# Patient Record
Sex: FEMALE | Race: WHITE | NOT HISPANIC OR LATINO | URBAN - METROPOLITAN AREA
[De-identification: names, ages, dates, MRNs, and addresses within clinical notes are randomized per-mention and may not be internally consistent; named-entity substitution may affect disease eponyms.]

---

## 2017-03-20 ENCOUNTER — INPATIENT (INPATIENT)
Facility: HOSPITAL | Age: 77
LOS: 1 days | Discharge: HOME | DRG: 311 | End: 2017-03-22
Attending: HOSPITALIST | Admitting: HOSPITALIST
Payer: MEDICARE

## 2017-03-20 VITALS
HEART RATE: 99 BPM | OXYGEN SATURATION: 98 % | HEIGHT: 60 IN | DIASTOLIC BLOOD PRESSURE: 91 MMHG | WEIGHT: 171.96 LBS | TEMPERATURE: 97 F | RESPIRATION RATE: 18 BRPM | SYSTOLIC BLOOD PRESSURE: 169 MMHG

## 2017-03-20 DIAGNOSIS — K21.9 GASTRO-ESOPHAGEAL REFLUX DISEASE WITHOUT ESOPHAGITIS: ICD-10-CM

## 2017-03-20 DIAGNOSIS — N39.3 STRESS INCONTINENCE (FEMALE) (MALE): ICD-10-CM

## 2017-03-20 DIAGNOSIS — E87.1 HYPO-OSMOLALITY AND HYPONATREMIA: ICD-10-CM

## 2017-03-20 DIAGNOSIS — Z90.710 ACQUIRED ABSENCE OF BOTH CERVIX AND UTERUS: Chronic | ICD-10-CM

## 2017-03-20 DIAGNOSIS — I24.9 ACUTE ISCHEMIC HEART DISEASE, UNSPECIFIED: ICD-10-CM

## 2017-03-20 LAB
ALBUMIN SERPL ELPH-MCNC: 4.4 G/DL — SIGNIFICANT CHANGE UP (ref 3.3–5.2)
ALP SERPL-CCNC: 94 U/L — SIGNIFICANT CHANGE UP (ref 40–120)
ALT FLD-CCNC: 23 U/L — SIGNIFICANT CHANGE UP
ANION GAP SERPL CALC-SCNC: 14 MMOL/L — SIGNIFICANT CHANGE UP (ref 5–17)
AST SERPL-CCNC: 34 U/L — HIGH
BASOPHILS # BLD AUTO: 0 K/UL — SIGNIFICANT CHANGE UP (ref 0–0.2)
BASOPHILS NFR BLD AUTO: 0.2 % — SIGNIFICANT CHANGE UP (ref 0–2)
BILIRUB SERPL-MCNC: 0.5 MG/DL — SIGNIFICANT CHANGE UP (ref 0.4–2)
BUN SERPL-MCNC: 23 MG/DL — HIGH (ref 8–20)
CALCIUM SERPL-MCNC: 9.1 MG/DL — SIGNIFICANT CHANGE UP (ref 8.6–10.2)
CHLORIDE SERPL-SCNC: 83 MMOL/L — LOW (ref 98–107)
CK MB CFR SERPL CALC: 5.4 NG/ML — SIGNIFICANT CHANGE UP (ref 0–6.7)
CK SERPL-CCNC: 238 U/L — HIGH (ref 25–170)
CO2 SERPL-SCNC: 25 MMOL/L — SIGNIFICANT CHANGE UP (ref 22–29)
CREAT SERPL-MCNC: 0.77 MG/DL — SIGNIFICANT CHANGE UP (ref 0.5–1.3)
EOSINOPHIL # BLD AUTO: 0.1 K/UL — SIGNIFICANT CHANGE UP (ref 0–0.5)
EOSINOPHIL NFR BLD AUTO: 0.8 % — SIGNIFICANT CHANGE UP (ref 0–6)
GLUCOSE SERPL-MCNC: 111 MG/DL — SIGNIFICANT CHANGE UP (ref 70–115)
HCT VFR BLD CALC: 40.1 % — SIGNIFICANT CHANGE UP (ref 37–47)
HGB BLD-MCNC: 14.4 G/DL — SIGNIFICANT CHANGE UP (ref 12–16)
LYMPHOCYTES # BLD AUTO: 2.9 K/UL — SIGNIFICANT CHANGE UP (ref 1–4.8)
LYMPHOCYTES # BLD AUTO: 24 % — SIGNIFICANT CHANGE UP (ref 20–55)
MCHC RBC-ENTMCNC: 29.9 PG — SIGNIFICANT CHANGE UP (ref 27–31)
MCHC RBC-ENTMCNC: 35.9 G/DL — SIGNIFICANT CHANGE UP (ref 32–36)
MCV RBC AUTO: 83.4 FL — SIGNIFICANT CHANGE UP (ref 81–99)
MONOCYTES # BLD AUTO: 1.4 K/UL — HIGH (ref 0–0.8)
MONOCYTES NFR BLD AUTO: 11.2 % — HIGH (ref 3–10)
NEUTROPHILS # BLD AUTO: 7.8 K/UL — SIGNIFICANT CHANGE UP (ref 1.8–8)
NEUTROPHILS NFR BLD AUTO: 63.6 % — SIGNIFICANT CHANGE UP (ref 37–73)
PLATELET # BLD AUTO: 276 K/UL — SIGNIFICANT CHANGE UP (ref 150–400)
POTASSIUM SERPL-MCNC: 3.6 MMOL/L — SIGNIFICANT CHANGE UP (ref 3.5–5.3)
POTASSIUM SERPL-SCNC: 3.6 MMOL/L — SIGNIFICANT CHANGE UP (ref 3.5–5.3)
PROT SERPL-MCNC: 7.9 G/DL — SIGNIFICANT CHANGE UP (ref 6.6–8.7)
RBC # BLD: 4.81 M/UL — SIGNIFICANT CHANGE UP (ref 4.4–5.2)
RBC # FLD: 12.2 % — SIGNIFICANT CHANGE UP (ref 11–15.6)
SODIUM SERPL-SCNC: 122 MMOL/L — LOW (ref 135–145)
TROPONIN T SERPL-MCNC: <0.01 NG/ML — SIGNIFICANT CHANGE UP (ref 0–0.06)
WBC # BLD: 12.3 K/UL — HIGH (ref 4.8–10.8)
WBC # FLD AUTO: 12.3 K/UL — HIGH (ref 4.8–10.8)

## 2017-03-20 PROCEDURE — 99285 EMERGENCY DEPT VISIT HI MDM: CPT | Mod: 25

## 2017-03-20 PROCEDURE — 71010: CPT | Mod: 26

## 2017-03-20 PROCEDURE — 93010 ELECTROCARDIOGRAM REPORT: CPT | Mod: 76

## 2017-03-20 PROCEDURE — 99223 1ST HOSP IP/OBS HIGH 75: CPT | Mod: AI

## 2017-03-20 RX ORDER — SIMVASTATIN 20 MG/1
1 TABLET, FILM COATED ORAL
Qty: 0 | Refills: 0 | COMMUNITY

## 2017-03-20 RX ORDER — SODIUM CHLORIDE 9 MG/ML
3 INJECTION INTRAMUSCULAR; INTRAVENOUS; SUBCUTANEOUS ONCE
Qty: 0 | Refills: 0 | Status: COMPLETED | OUTPATIENT
Start: 2017-03-20 | End: 2017-03-20

## 2017-03-20 RX ORDER — METOPROLOL TARTRATE 50 MG
25 TABLET ORAL
Qty: 0 | Refills: 0 | Status: DISCONTINUED | OUTPATIENT
Start: 2017-03-20 | End: 2017-03-22

## 2017-03-20 RX ORDER — PANTOPRAZOLE SODIUM 20 MG/1
40 TABLET, DELAYED RELEASE ORAL
Qty: 0 | Refills: 0 | Status: DISCONTINUED | OUTPATIENT
Start: 2017-03-20 | End: 2017-03-22

## 2017-03-20 RX ORDER — ACETAMINOPHEN 500 MG
650 TABLET ORAL EVERY 6 HOURS
Qty: 0 | Refills: 0 | Status: DISCONTINUED | OUTPATIENT
Start: 2017-03-20 | End: 2017-03-22

## 2017-03-20 RX ORDER — SODIUM CHLORIDE 9 MG/ML
500 INJECTION INTRAMUSCULAR; INTRAVENOUS; SUBCUTANEOUS ONCE
Qty: 0 | Refills: 0 | Status: COMPLETED | OUTPATIENT
Start: 2017-03-20 | End: 2017-03-20

## 2017-03-20 RX ORDER — SIMVASTATIN 20 MG/1
20 TABLET, FILM COATED ORAL AT BEDTIME
Qty: 0 | Refills: 0 | Status: DISCONTINUED | OUTPATIENT
Start: 2017-03-20 | End: 2017-03-22

## 2017-03-20 RX ORDER — OXYBUTYNIN CHLORIDE 5 MG
1 TABLET ORAL
Qty: 0 | Refills: 0 | COMMUNITY

## 2017-03-20 RX ORDER — ASPIRIN/CALCIUM CARB/MAGNESIUM 324 MG
81 TABLET ORAL DAILY
Qty: 0 | Refills: 0 | Status: DISCONTINUED | OUTPATIENT
Start: 2017-03-20 | End: 2017-03-22

## 2017-03-20 RX ORDER — OXYBUTYNIN CHLORIDE 5 MG
5 TABLET ORAL
Qty: 0 | Refills: 0 | Status: DISCONTINUED | OUTPATIENT
Start: 2017-03-20 | End: 2017-03-22

## 2017-03-20 RX ORDER — ONDANSETRON 8 MG/1
4 TABLET, FILM COATED ORAL EVERY 6 HOURS
Qty: 0 | Refills: 0 | Status: DISCONTINUED | OUTPATIENT
Start: 2017-03-20 | End: 2017-03-22

## 2017-03-20 RX ORDER — ENOXAPARIN SODIUM 100 MG/ML
40 INJECTION SUBCUTANEOUS DAILY
Qty: 0 | Refills: 0 | Status: DISCONTINUED | OUTPATIENT
Start: 2017-03-20 | End: 2017-03-22

## 2017-03-20 RX ORDER — PANTOPRAZOLE SODIUM 20 MG/1
1 TABLET, DELAYED RELEASE ORAL
Qty: 0 | Refills: 0 | COMMUNITY

## 2017-03-20 RX ADMIN — SODIUM CHLORIDE 3 MILLILITER(S): 9 INJECTION INTRAMUSCULAR; INTRAVENOUS; SUBCUTANEOUS at 03:45

## 2017-03-20 RX ADMIN — Medication 5 MILLIGRAM(S): at 17:29

## 2017-03-20 RX ADMIN — Medication 1 TABLET(S): at 11:49

## 2017-03-20 RX ADMIN — Medication 650 MILLIGRAM(S): at 17:33

## 2017-03-20 RX ADMIN — ENOXAPARIN SODIUM 40 MILLIGRAM(S): 100 INJECTION SUBCUTANEOUS at 11:49

## 2017-03-20 RX ADMIN — Medication 25 MILLIGRAM(S): at 17:29

## 2017-03-20 RX ADMIN — Medication 650 MILLIGRAM(S): at 08:20

## 2017-03-20 RX ADMIN — SIMVASTATIN 20 MILLIGRAM(S): 20 TABLET, FILM COATED ORAL at 23:00

## 2017-03-20 RX ADMIN — Medication 81 MILLIGRAM(S): at 11:49

## 2017-03-20 RX ADMIN — SODIUM CHLORIDE 500 MILLILITER(S): 9 INJECTION INTRAMUSCULAR; INTRAVENOUS; SUBCUTANEOUS at 06:29

## 2017-03-20 RX ADMIN — Medication 650 MILLIGRAM(S): at 18:45

## 2017-03-20 NOTE — H&P ADULT - PROBLEM SELECTOR PLAN 1
Empiric aspirin therapy initiated. Serial troponin levels requested. Telemetry monitoring. Cardiology consultation requested. Blood pressure elevated on initial presentation and metoprolol also initiated. Empiric aspirin therapy initiated. Serial troponin levels requested. Telemetry monitoring. Cardiology consultation requested (Dr. Perez service contacted at the request of the patient and her ). Blood pressure elevated on initial presentation and metoprolol also initiated.

## 2017-03-20 NOTE — ED PROVIDER NOTE - OBJECTIVE STATEMENT
75 yo F presents to ED via EMS with  c/o sudden onset of sharp, midsternal chest pain which started while lying in bed this AM.  Pt states she fell on the ice 3 days ago and hurt her back and twisted when she fel and has been having some assoc rib pain. Pt denies any assoc SOB, N/V, diaphoresis or syncope.  Pt states pain was initially 9/10.  Pt's  gave her 1 SL NTG which decreased pain to 5/10 and then resolved after being given ASA and oxygen by EMS PTA.  Pt believes she had a negative stress test approx 6 yrs ago

## 2017-03-20 NOTE — ED PROVIDER NOTE - CONSTITUTIONAL, MLM
normal... Well appearing, well nourished elderly F awake, alert, oriented to person, place, time/situation and in no apparent distress.

## 2017-03-20 NOTE — ED ADULT NURSE NOTE - CHIEF COMPLAINT QUOTE
Patient states that she woke up from sleep with midsternal chest pain. As per EMS patient states that she fell a few days ago and was having back pain.  gave patient nitro spray at home with some relief, ems administered 324 asa, at this time patient states that she feels better

## 2017-03-20 NOTE — H&P ADULT - NSHPPHYSICALEXAM_GEN_ALL_CORE
General appearance: NAD, Awake, Alert  HEENT: NCAT, Conjunctiva clear, EOMI, Pupils reactive  Neck: Supple, No JVD, No tenderness  Lungs: Clear to auscultation, Breath sound equal bilaterally, No wheezes, No rales  Cardiovascular: S1S2, Regular rhythm  Abdomen: Soft, Nontender, Nondistended, No guarding/rebound, Positive bowel sounds  Extremities: No clubbing, No cyanosis, No edema, No calf tenderness  Neuro: Strength equal bilaterally, No tremors  Psychiatric: Awake and oriented, No anxiety

## 2017-03-20 NOTE — ED ADULT TRIAGE NOTE - CHIEF COMPLAINT QUOTE
Patient states that she woke up from sleep with midsternal chest pain. As per EMS patient states that she fell a few days ago and was having back pain.  gave patient nitro spray at home with some relief Patient states that she woke up from sleep with midsternal chest pain. As per EMS patient states that she fell a few days ago and was having back pain.  gave patient nitro spray at home with some relief, ems administered 324 asa, at this time patient states that she feels better

## 2017-03-20 NOTE — ED PROVIDER NOTE - PROGRESS NOTE DETAILS
Labs as noted.  Will admit for hyponatremia and ACS.  Case d/w Hospitalist/Dr. Herzog and will admit to Tele

## 2017-03-20 NOTE — ED ADULT NURSE REASSESSMENT NOTE - NS ED NURSE REASSESS COMMENT FT1
Dr Sapp, cardiology at bedside
Pt sitting comfortably on stretcher, denies any pain, POC discussed with pt, awaiting bed assignment, spouse at bedside, pt and spouse verbalize understanding, safety and comfort measures in place.
Assumed pt care at this time. Pt A & Ox3, respirations are even & unlabored. Pt states she has back pain from sitting in the bed and a fall she had on Thursday, Pt states she slipped on ice, denies LOC, hitting head. Pt denies chest pain at this time.  Admitting MD Mchugh at bedside.

## 2017-03-20 NOTE — H&P ADULT - HISTORY OF PRESENT ILLNESS
76F presented with complaints of chest discomfort which woke her from sleep earlier this morning. The pain was located in the center of her chest and described as sharp with radiation to the back. She denied any associated dyspnea or palpitations. The patient also denied any similar symptoms in the past. The chest pain improved with the administration of nitroglycerin. The patient had a second episode of similar symptoms while in the emergency department.  Initial laboratory results noted hyponatremia. The patient reported having episodes of nausea and vomiting at home due to the back pain from a recent fall. The patient had fallen onto her buttocks resulting in lower back pain. She was able to tolerate intake of fluids but had been eating a small amount of food. 76F presented with complaints of chest discomfort which woke her from sleep earlier this morning. The pain was located in the center of her chest and described as sharp with radiation to the back. She denied any associated dyspnea or palpitations. The patient also denied any similar symptoms in the past. The chest pain improved with the administration of nitroglycerin. The patient had a second episode of similar symptoms while in the emergency department. She reports a negative stress test many years ago.  Initial laboratory results noted hyponatremia. The patient reported having episodes of nausea and vomiting at home due to the back pain from a recent fall. The patient had fallen onto her buttocks resulting in lower back pain. She was able to tolerate intake of fluids but had been eating a small amount of food.

## 2017-03-20 NOTE — CONSULT NOTE ADULT - SUBJECTIVE AND OBJECTIVE BOX
[facility], THE HEART CENTER                                   [address] [place] [address]                                                      PHONE: [phone]                                                         FAX: [phone]  -------------------------------------------------------------------------------------------------------------------------------    76y Female with past medical history as under chest discomfort which woke her from sleep earlier this morning. The pain was located in the center of her chest and described as sharp with radiation to the back. She denied any associated dyspnea or palpitations. The patient also denied any similar symptoms in the past. The chest pain improved with the administration of nitroglycerin. The patient had a second episode of similar symptoms while in the emergency department. Pt otherwise with good functional capacity at baseline. Reportedly fell 2 days ago and complains of back pain and has been using Advil with tylenol that she usually does not take. Pt hypertensive in ED but usually does not have elevated BP.    PAST MEDICAL & SURGICAL HISTORY:  GERD (gastroesophageal reflux disease)  Stress incontinence, female  S/P hysterectomy    No Known Allergies      Review of Systems:   Positive for chest pain, back pain  Rest of the systems were reviewed and was negative.     Social History:  No smoking   No alcohol  No other drug use    Vital Signs Last 24 Hrs  T(C): 36.3, Max: 36.3 (03-20 @ 07:20)  T(F): 97.4, Max: 97.4 (03-20 @ 07:20)  HR: 90 (90 - 99)  BP: 162/83 (162/83 - 169/91)  BP(mean): --  RR: 18 (18 - 18)  SpO2: 99% (98% - 99%)    PHYSICAL EXAM:  Constitutional: Oriented to time, place and person. Appears well developed, well nourished; alert and co-operative  HEENT:     Conjunctiva normal, Normal oral mucosa, No JVD	  Cardiovascular: Normal S1 S2, No murmurs  Respiratory: Lungs clear to auscultation; no crepitations, no wheeze  Gastrointestinal:  Soft, Non-tender, + BS	  Extremities: No cyanosis, clubbing or edema  Skin: Warm and dry  Neurologic: Alert oriented to time place and person  Psychiatric: affect was normal        LABS:                        14.4   12.3  )-----------( 276      ( 20 Mar 2017 03:35 )             40.1     20 Mar 2017 03:35    122    |  83     |  23.0   ----------------------------<  111    3.6     |  25.0   |  0.77     Ca    9.1        20 Mar 2017 03:35    TPro  7.9    /  Alb  4.4    /  TBili  0.5    /  DBili  x      /  AST  34     /  ALT  23     /  AlkPhos  94     20 Mar 2017 03:35    CARDIAC MARKERS ( 20 Mar 2017 03:35 )  x     / <0.01 ng/mL / 238 U/L / x     / 5.4 ng/mL          RADIOLOGY & ADDITIONAL STUDIES:    CARDIOLOGY TESTING:     ECG: NSR with inferior q waves     MEDICATIONS:  MEDICATIONS  (STANDING):  pantoprazole    Tablet 40milliGRAM(s) Oral before breakfast  metoprolol 25milliGRAM(s) Oral two times a day  aspirin  chewable 81milliGRAM(s) Oral daily  simvastatin 20milliGRAM(s) Oral at bedtime  oxybutynin 5milliGRAM(s) Oral two times a day  multivitamin 1Tablet(s) Oral daily  enoxaparin Injectable 40milliGRAM(s) SubCutaneous daily    MEDICATIONS  (PRN):  ondansetron Injectable 4milliGRAM(s) IV Push every 6 hours PRN Nausea and/or Vomiting  acetaminophen   Tablet. 650milliGRAM(s) Oral every 6 hours PRN Mild Pain (1 - 3)      ASSESSMENT AND PLAN:    76y Female with prior history of obesity, GERD with epigastric pain/chest pain and noted to have hyponatremia. ECG shows inferior q waves. Troponin negative so far.    - Serial troponin  - Repeat Na  - Echo to assess wall motion  - Further work up regarding inpt vs. outpt ischemic work-up pending results of trop and Echo results.

## 2017-03-21 LAB
ANION GAP SERPL CALC-SCNC: 11 MMOL/L — SIGNIFICANT CHANGE UP (ref 5–17)
BUN SERPL-MCNC: 23 MG/DL — HIGH (ref 8–20)
CALCIUM SERPL-MCNC: 9 MG/DL — SIGNIFICANT CHANGE UP (ref 8.6–10.2)
CHLORIDE SERPL-SCNC: 90 MMOL/L — LOW (ref 98–107)
CO2 SERPL-SCNC: 28 MMOL/L — SIGNIFICANT CHANGE UP (ref 22–29)
CREAT SERPL-MCNC: 0.78 MG/DL — SIGNIFICANT CHANGE UP (ref 0.5–1.3)
GLUCOSE SERPL-MCNC: 100 MG/DL — SIGNIFICANT CHANGE UP (ref 70–115)
POTASSIUM SERPL-MCNC: 3.8 MMOL/L — SIGNIFICANT CHANGE UP (ref 3.5–5.3)
POTASSIUM SERPL-SCNC: 3.8 MMOL/L — SIGNIFICANT CHANGE UP (ref 3.5–5.3)
SODIUM SERPL-SCNC: 129 MMOL/L — LOW (ref 135–145)
T3 SERPL-MCNC: 87 NG/DL — SIGNIFICANT CHANGE UP (ref 80–200)
T4 AB SER-ACNC: 9.1 UG/DL — SIGNIFICANT CHANGE UP (ref 4.5–12)
TSH SERPL-MCNC: 2.28 UIU/ML — SIGNIFICANT CHANGE UP (ref 0.27–4.2)

## 2017-03-21 PROCEDURE — 99233 SBSQ HOSP IP/OBS HIGH 50: CPT

## 2017-03-21 RX ORDER — OXYCODONE HYDROCHLORIDE 5 MG/1
5 TABLET ORAL THREE TIMES A DAY
Qty: 0 | Refills: 0 | Status: DISCONTINUED | OUTPATIENT
Start: 2017-03-21 | End: 2017-03-22

## 2017-03-21 RX ADMIN — Medication 650 MILLIGRAM(S): at 07:20

## 2017-03-21 RX ADMIN — Medication 1 TABLET(S): at 11:39

## 2017-03-21 RX ADMIN — Medication 5 MILLIGRAM(S): at 17:59

## 2017-03-21 RX ADMIN — PANTOPRAZOLE SODIUM 40 MILLIGRAM(S): 20 TABLET, DELAYED RELEASE ORAL at 06:25

## 2017-03-21 RX ADMIN — Medication 5 MILLIGRAM(S): at 06:25

## 2017-03-21 RX ADMIN — Medication 25 MILLIGRAM(S): at 17:59

## 2017-03-21 RX ADMIN — ONDANSETRON 4 MILLIGRAM(S): 8 TABLET, FILM COATED ORAL at 10:26

## 2017-03-21 RX ADMIN — SIMVASTATIN 20 MILLIGRAM(S): 20 TABLET, FILM COATED ORAL at 21:38

## 2017-03-21 RX ADMIN — Medication 25 MILLIGRAM(S): at 06:25

## 2017-03-21 RX ADMIN — ENOXAPARIN SODIUM 40 MILLIGRAM(S): 100 INJECTION SUBCUTANEOUS at 11:39

## 2017-03-21 RX ADMIN — Medication 81 MILLIGRAM(S): at 11:39

## 2017-03-21 RX ADMIN — Medication 650 MILLIGRAM(S): at 06:25

## 2017-03-21 NOTE — PROGRESS NOTE ADULT - SUBJECTIVE AND OBJECTIVE BOX
CONCHITA DALY   ------------------------------  The patient was seen and evaluated for hyponatremia  The patient is in no acute distress.  Denied any chest pain, palpitations, shortness of breath, abdominal pain.  Back pain improved with medication.    Vital Signs Last 24 Hrs  T(C): 36.7, Max: 36.7 (03-20 @ 20:14)  T(F): 98, Max: 98.1 (03-20 @ 20:14)  HR: 67 (64 - 85)  BP: 122/74 (120/720 - 154/70)  BP(mean): --  RR: 18 (17 - 18)  SpO2: 95% (95% - 98%)    PHYSICAL EXAMINATION:  ----------------------------------------  General appearance: NAD, Awake, Alert  HEENT: NCAT, Conjunctiva clear, EOMI, Pupils reactive  Neck: Supple, No JVD, No tenderness  Lungs: Clear to auscultation, Breath sound equal bilaterally, No wheezes, No rales  Cardiovascular: S1S2, Regular rhythm  Abdomen: Soft, Nontender, Nondistended, No guarding/rebound, Positive bowel sounds  Extremities: No clubbing, No cyanosis, No edema, No calf tenderness  Neuro: Strength equal bilaterally, No tremors  Psychiatric: Awake and oriented, No anxiety    LABS:  ------------------------------             14.4   12.3  )-----------( 276      ( 20 Mar 2017 03:35 )             40.1     21 Mar 2017 06:55    129    |  90     |  23.0   ----------------------------<  100    3.8     |  28.0   |  0.78     Ca    9.0        21 Mar 2017 06:55    TPro  7.9    /  Alb  4.4    /  TBili  0.5    /  DBili  x      /  AST  34     /  ALT  23     /  AlkPhos  94     20 Mar 2017 03:35    LIVER FUNCTIONS - ( 20 Mar 2017 03:35 )  Alb: 4.4 g/dL / Pro: 7.9 g/dL / ALK PHOS: 94 U/L / ALT: 23 U/L / AST: 34 U/L / GGT: x           CARDIAC MARKERS ( 20 Mar 2017 20:56 )  x     / <0.01 ng/mL / x     / x     / x      CARDIAC MARKERS ( 20 Mar 2017 12:37 )  x     / <0.01 ng/mL / x     / x     / x      CARDIAC MARKERS ( 20 Mar 2017 03:35 )  x     / <0.01 ng/mL / 238 U/L / x     / 5.4 ng/mL    MEDICATIONS  (STANDING):  pantoprazole    Tablet 40milliGRAM(s) Oral before breakfast  metoprolol 25milliGRAM(s) Oral two times a day  aspirin  chewable 81milliGRAM(s) Oral daily  simvastatin 20milliGRAM(s) Oral at bedtime  oxybutynin 5milliGRAM(s) Oral two times a day  multivitamin 1Tablet(s) Oral daily  enoxaparin Injectable 40milliGRAM(s) SubCutaneous daily    MEDICATIONS  (PRN):  ondansetron Injectable 4milliGRAM(s) IV Push every 6 hours PRN Nausea and/or Vomiting  acetaminophen   Tablet. 650milliGRAM(s) Oral every 6 hours PRN Mild Pain (1 - 3)      ASSESSMENT / PLAN:  -----------------------------------  Chest pain - Serial troponin levels not elevated. Echocardiogram with preserved left ventricular function.    Hyponatremia - Repeat laboratory results with improvement in the sodium level. Continue on regular diet. Repeat laboratory studies requested to monitor.    Nausea - Antiemetic medications as needed.    GERD - On pantoprazole.    Back pain - Analgesic medications as needed.

## 2017-03-21 NOTE — PROGRESS NOTE ADULT - SUBJECTIVE AND OBJECTIVE BOX
[facility], THE [facility]                                   [address] [place] [address]                                                      PHONE: [phone]                                                         FAX: [phone]  [email or web address]  ---------------------------------------------------------------------------------------------------------------------------------    Overnight events/patient complaints:  No events, no further chest pain. Still with back pain but improved with pain meds.    No Known Allergies    MEDICATIONS  (STANDING):  pantoprazole    Tablet 40milliGRAM(s) Oral before breakfast  metoprolol 25milliGRAM(s) Oral two times a day  aspirin  chewable 81milliGRAM(s) Oral daily  simvastatin 20milliGRAM(s) Oral at bedtime  oxybutynin 5milliGRAM(s) Oral two times a day  multivitamin 1Tablet(s) Oral daily  enoxaparin Injectable 40milliGRAM(s) SubCutaneous daily    MEDICATIONS  (PRN):  ondansetron Injectable 4milliGRAM(s) IV Push every 6 hours PRN Nausea and/or Vomiting  acetaminophen   Tablet. 650milliGRAM(s) Oral every 6 hours PRN Mild Pain (1 - 3)  oxyCODONE IR 5milliGRAM(s) Oral three times a day PRN Moderate Pain (4 - 6)      Vital Signs Last 24 Hrs  T(C): 36.6, Max: 36.7 (03-20 @ 20:14)  T(F): 97.8, Max: 98.1 (03-20 @ 20:14)  HR: 67 (64 - 85)  BP: 122/74 (120/720 - 154/70)  BP(mean): --  RR: 18 (17 - 18)  SpO2: 95% (95% - 98%)  ICU Vital Signs Last 24 Hrs  CONCHITA MALIKA  I&O's Detail  I & Os for 24h ending 21 Mar 2017 07:00  =============================================  IN:    Total IN: 0 ml  ---------------------------------------------  OUT:    Voided: 600 ml    Total OUT: 600 ml  ---------------------------------------------  Total NET: -600 ml    I & Os for current day (as of 21 Mar 2017 16:22)  =============================================  IN:    Oral Fluid: 240 ml    Total IN: 240 ml  ---------------------------------------------  OUT:    Voided: 600 ml    Total OUT: 600 ml  ---------------------------------------------  Total NET: -360 ml    Drug Dosing Weight  CONCHITA DALY      PHYSICAL EXAM:  General: Appears well developed, well nourished alert and cooperative.  HEENT: Head; normocephalic, atraumatic.  Eyes: Pupils reactive, cornea wnl.  Neck: Supple, no nodes adenopathy, no NVD or carotid bruit or thyromegaly.  CARDIOVASCULAR: Normal S1 and S2, No murmur, rub, gallop or lift.   LUNGS: No rales, rhonchi or wheeze. Normal breath sounds bilaterally.  ABDOMEN: Soft, nontender without mass or organomegaly. bowel sounds normoactive.  EXTREMITIES: No clubbing, cyanosis or edema. Distal pulses wnl.   SKIN: warm and dry with normal turgor.  NEURO: Alert/oriented x 3/normal motor exam. No pathologic reflexes.    PSYCH: normal affect.        LABS:                        14.4   12.3  )-----------( 276      ( 20 Mar 2017 03:35 )             40.1     21 Mar 2017 06:55    129    |  90     |  23.0   ----------------------------<  100    3.8     |  28.0   |  0.78     Ca    9.0        21 Mar 2017 06:55    TPro  7.9    /  Alb  4.4    /  TBili  0.5    /  DBili  x      /  AST  34     /  ALT  23     /  AlkPhos  94     20 Mar 2017 03:35    CONCHITA GREEN  CARDIAC MARKERS ( 20 Mar 2017 20:56 )  x     / <0.01 ng/mL / x     / x     / x      CARDIAC MARKERS ( 20 Mar 2017 12:37 )  x     / <0.01 ng/mL / x     / x     / x      CARDIAC MARKERS ( 20 Mar 2017 03:35 )  x     / <0.01 ng/mL / 238 U/L / x     / 5.4 ng/mL            RADIOLOGY & ADDITIONAL STUDIES:    INTERPRETATION OF TELEMETRY (personally reviewed): no events    ECG:    ECHO:  1. Left ventricular ejection fraction, by visual estimation, is 60 to   65%.   2. Technically suboptimal study.   3. Normal global left ventricular systolic function.   4. Spectral Doppler shows impaired relaxation pattern of left   ventricular myocardial filling (Grade I diastolic dysfunction).   5. Thickening of the anterior mitral valve leaflet.   6. Sclerotic aortic valve with normal opening.   7. LA volume Index is 24.1 ml/m² ml/m2.     MD Avtar Electronically signed on 3/20/2017 at 4:29:03 PM    STRESS TEST:    CARDIAC CATHETERIZATION:    ASSESSMENT AND PLAN:  In summary, CONCHITA DALY is an 76y Female with past medical history significant for [place] [facility] - [facility], [facility]                                   [address] [place] [address]                                                      PHONE: [phone]                                                         FAX: [phone]  [email or web address]  ---------------------------------------------------------------------------------------------------------------------------------    Overnight events/patient complaints:  No events, no further chest pain. Still with back pain but improved with pain meds.    No Known Allergies    MEDICATIONS  (STANDING):  pantoprazole    Tablet 40milliGRAM(s) Oral before breakfast  metoprolol 25milliGRAM(s) Oral two times a day  aspirin  chewable 81milliGRAM(s) Oral daily  simvastatin 20milliGRAM(s) Oral at bedtime  oxybutynin 5milliGRAM(s) Oral two times a day  multivitamin 1Tablet(s) Oral daily  enoxaparin Injectable 40milliGRAM(s) SubCutaneous daily    MEDICATIONS  (PRN):  ondansetron Injectable 4milliGRAM(s) IV Push every 6 hours PRN Nausea and/or Vomiting  acetaminophen   Tablet. 650milliGRAM(s) Oral every 6 hours PRN Mild Pain (1 - 3)  oxyCODONE IR 5milliGRAM(s) Oral three times a day PRN Moderate Pain (4 - 6)      Vital Signs Last 24 Hrs  T(C): 36.6, Max: 36.7 (03-20 @ 20:14)  T(F): 97.8, Max: 98.1 (03-20 @ 20:14)  HR: 67 (64 - 85)  BP: 122/74 (120/720 - 154/70)  BP(mean): --  RR: 18 (17 - 18)  SpO2: 95% (95% - 98%)  ICU Vital Signs Last 24 Hrs  CONCHITA MALIKA  I&O's Detail  I & Os for 24h ending 21 Mar 2017 07:00  =============================================  IN:    Total IN: 0 ml  ---------------------------------------------  OUT:    Voided: 600 ml    Total OUT: 600 ml  ---------------------------------------------  Total NET: -600 ml    I & Os for current day (as of 21 Mar 2017 16:22)  =============================================  IN:    Oral Fluid: 240 ml    Total IN: 240 ml  ---------------------------------------------  OUT:    Voided: 600 ml    Total OUT: 600 ml  ---------------------------------------------  Total NET: -360 ml    Drug Dosing Weight  CONCHITA DALY      PHYSICAL EXAM:  General: Appears well developed, well nourished alert and cooperative.  HEENT: Head; normocephalic, atraumatic.  Eyes: Pupils reactive, cornea wnl.  Neck: Supple, no nodes adenopathy, no NVD or carotid bruit or thyromegaly.  CARDIOVASCULAR: Normal S1 and S2, No murmur, rub, gallop or lift.   LUNGS: No rales, rhonchi or wheeze. Normal breath sounds bilaterally.  ABDOMEN: Soft, nontender without mass or organomegaly. bowel sounds normoactive.  EXTREMITIES: No clubbing, cyanosis or edema. Distal pulses wnl.   SKIN: warm and dry with normal turgor.  NEURO: Alert/oriented x 3/normal motor exam. No pathologic reflexes.    PSYCH: normal affect.        LABS:                        14.4   12.3  )-----------( 276      ( 20 Mar 2017 03:35 )             40.1     21 Mar 2017 06:55    129    |  90     |  23.0   ----------------------------<  100    3.8     |  28.0   |  0.78     Ca    9.0        21 Mar 2017 06:55    TPro  7.9    /  Alb  4.4    /  TBili  0.5    /  DBili  x      /  AST  34     /  ALT  23     /  AlkPhos  94     20 Mar 2017 03:35    CONCHITA GREEN  CARDIAC MARKERS ( 20 Mar 2017 20:56 )  x     / <0.01 ng/mL / x     / x     / x      CARDIAC MARKERS ( 20 Mar 2017 12:37 )  x     / <0.01 ng/mL / x     / x     / x      CARDIAC MARKERS ( 20 Mar 2017 03:35 )  x     / <0.01 ng/mL / 238 U/L / x     / 5.4 ng/mL            RADIOLOGY & ADDITIONAL STUDIES:    INTERPRETATION OF TELEMETRY (personally reviewed): no events    ECG:NS@ 83 no acute ischemic changes    ECHO:    1. Left ventricular ejection fraction, by visual estimation, is 60 to   65%.   2. Technically suboptimal study.   3. Normal global left ventricular systolic function.   4. Spectral Doppler shows impaired relaxation pattern of left   ventricular myocardial filling (Grade I diastolic dysfunction).   5. Thickening of the anterior mitral valve leaflet.   6. Sclerotic aortic valve with normal opening.   7. LA volume Index is 24.1 ml/m² ml/m2.     MD Avtar Electronically signed on 3/20/2017 at 4:29:03 PM          ASSESSMENT AND PLAN:  In summary, CONCHITA DALY is an 76y Female with past medical history significant for obesity admitted with chest pain. Of note she had a recent fall and has back pain thus not able to perform exercise stress test.    1) no further [facility] workup at this time  2) CARDIAC PET within 1 week and FU 2 weeks  3) Please recall with any questions or concerns

## 2017-03-22 VITALS
SYSTOLIC BLOOD PRESSURE: 130 MMHG | OXYGEN SATURATION: 98 % | RESPIRATION RATE: 18 BRPM | DIASTOLIC BLOOD PRESSURE: 64 MMHG | TEMPERATURE: 98 F | HEART RATE: 69 BPM

## 2017-03-22 LAB
ANION GAP SERPL CALC-SCNC: 11 MMOL/L — SIGNIFICANT CHANGE UP (ref 5–17)
BUN SERPL-MCNC: 20 MG/DL — SIGNIFICANT CHANGE UP (ref 8–20)
CALCIUM SERPL-MCNC: 9.3 MG/DL — SIGNIFICANT CHANGE UP (ref 8.6–10.2)
CHLORIDE SERPL-SCNC: 92 MMOL/L — LOW (ref 98–107)
CO2 SERPL-SCNC: 27 MMOL/L — SIGNIFICANT CHANGE UP (ref 22–29)
CREAT SERPL-MCNC: 0.77 MG/DL — SIGNIFICANT CHANGE UP (ref 0.5–1.3)
GLUCOSE SERPL-MCNC: 117 MG/DL — HIGH (ref 70–115)
POTASSIUM SERPL-MCNC: 4 MMOL/L — SIGNIFICANT CHANGE UP (ref 3.5–5.3)
POTASSIUM SERPL-SCNC: 4 MMOL/L — SIGNIFICANT CHANGE UP (ref 3.5–5.3)
SODIUM SERPL-SCNC: 130 MMOL/L — LOW (ref 135–145)

## 2017-03-22 PROCEDURE — 82550 ASSAY OF CK (CPK): CPT

## 2017-03-22 PROCEDURE — 84443 ASSAY THYROID STIM HORMONE: CPT

## 2017-03-22 PROCEDURE — 84480 ASSAY TRIIODOTHYRONINE (T3): CPT

## 2017-03-22 PROCEDURE — 93005 ELECTROCARDIOGRAM TRACING: CPT

## 2017-03-22 PROCEDURE — 36415 COLL VENOUS BLD VENIPUNCTURE: CPT

## 2017-03-22 PROCEDURE — 99239 HOSP IP/OBS DSCHRG MGMT >30: CPT

## 2017-03-22 PROCEDURE — 80053 COMPREHEN METABOLIC PANEL: CPT

## 2017-03-22 PROCEDURE — 80048 BASIC METABOLIC PNL TOTAL CA: CPT

## 2017-03-22 PROCEDURE — 71045 X-RAY EXAM CHEST 1 VIEW: CPT

## 2017-03-22 PROCEDURE — 84484 ASSAY OF TROPONIN QUANT: CPT

## 2017-03-22 PROCEDURE — 84436 ASSAY OF TOTAL THYROXINE: CPT

## 2017-03-22 PROCEDURE — 85027 COMPLETE CBC AUTOMATED: CPT

## 2017-03-22 PROCEDURE — 82553 CREATINE MB FRACTION: CPT

## 2017-03-22 PROCEDURE — 93306 TTE W/DOPPLER COMPLETE: CPT

## 2017-03-22 PROCEDURE — 99285 EMERGENCY DEPT VISIT HI MDM: CPT | Mod: 25

## 2017-03-22 RX ORDER — OXYCODONE HYDROCHLORIDE 5 MG/1
1 TABLET ORAL
Qty: 10 | Refills: 0 | OUTPATIENT
Start: 2017-03-22

## 2017-03-22 RX ORDER — ASPIRIN/CALCIUM CARB/MAGNESIUM 324 MG
1 TABLET ORAL
Qty: 0 | Refills: 0 | COMMUNITY
Start: 2017-03-22

## 2017-03-22 RX ADMIN — Medication 81 MILLIGRAM(S): at 11:27

## 2017-03-22 RX ADMIN — PANTOPRAZOLE SODIUM 40 MILLIGRAM(S): 20 TABLET, DELAYED RELEASE ORAL at 05:46

## 2017-03-22 RX ADMIN — Medication 5 MILLIGRAM(S): at 05:46

## 2017-03-22 RX ADMIN — Medication 25 MILLIGRAM(S): at 05:46

## 2017-03-22 RX ADMIN — OXYCODONE HYDROCHLORIDE 5 MILLIGRAM(S): 5 TABLET ORAL at 01:50

## 2017-03-22 RX ADMIN — OXYCODONE HYDROCHLORIDE 5 MILLIGRAM(S): 5 TABLET ORAL at 00:57

## 2017-03-22 RX ADMIN — Medication 1 TABLET(S): at 11:27

## 2017-03-22 RX ADMIN — ENOXAPARIN SODIUM 40 MILLIGRAM(S): 100 INJECTION SUBCUTANEOUS at 11:27

## 2017-03-22 NOTE — DISCHARGE NOTE ADULT - PATIENT PORTAL LINK FT
“You can access the [employer] Patient Portal, offered by [facility], by registering with the following website: [email or web address]”

## 2017-03-22 NOTE — DISCHARGE NOTE ADULT - CARE PLAN
Principal Discharge DX:	Hyponatremia  Goal:	.  Instructions for follow-up, activity and diet:	Continue on your regular diet as tolerated. Follow up with your primary care physician for further management and repeat laboratory studies to monitor.  Secondary Diagnosis:	ACS (acute coronary syndrome)  Instructions for follow-up, activity and diet:	Follow up with Cardiology for stress testing.  Secondary Diagnosis:	GERD (gastroesophageal reflux disease)  Instructions for follow-up, activity and diet:	Continue on pantoprazole.

## 2017-03-22 NOTE — DISCHARGE NOTE ADULT - CARE PROVIDER_API CALL
Donal Montgomery  Phone: (   )    -  Fax: (   )    -    Genaro Ernst (DO), Internal Medicine  [address] [place] [address]  Phone: [phone]  Fax: [phone]

## 2017-03-22 NOTE — DISCHARGE NOTE ADULT - PLAN OF CARE
. Continue on your regular diet as tolerated. Follow up with your primary care physician for further management and repeat laboratory studies to monitor. Follow up with Cardiology for stress testing. Continue on pantoprazole.

## 2017-03-22 NOTE — DISCHARGE NOTE ADULT - MEDICATION SUMMARY - MEDICATIONS TO TAKE
I will START or STAY ON the medications listed below when I get home from the hospital:    oxyCODONE 5 mg oral tablet  -- 1 tab(s) by mouth 3 times a day, As needed, Moderate Pain (4 - 6) MDD:3 tabs a day  -- Indication: For Pain    aspirin 81 mg oral tablet, chewable  -- 1 tab(s) by mouth once a day  -- Indication: For HTN    simvastatin 20 mg oral tablet  -- 1 tab(s) by mouth once a day (at bedtime)  -- Indication: For Hyperlipidemia    pantoprazole 40 mg oral delayed release tablet  -- 1 tab(s) by mouth once a day  -- Indication: For Gastritis    oxybutynin 15 mg/24 hr oral tablet, extended release  -- 1 tab(s) by mouth once a day  -- Indication: For Incontinence

## 2017-03-22 NOTE — PROGRESS NOTE ADULT - SUBJECTIVE AND OBJECTIVE BOX
CONCHITA DALY   ------------------------------  The patient was seen and evaluated for hyponatremia.  The patient is in no acute distress.  Denied any chest pain, palpitations, shortness of breath, abdominal pain.  Feels well.    Vital Signs Last 24 Hrs  T(C): 36.5, Max: 36.7 (03-21 @ 11:38)  T(F): 97.7, Max: 98 (03-21 @ 11:38)  HR: 72 (67 - 80)  BP: 138/70 (122/74 - 138/70)  BP(mean): --  RR: 18 (18 - 18)  SpO2: 97% (95% - 97%)    PHYSICAL EXAMINATION:  ----------------------------------------  General appearance: NAD, Awake, Alert  HEENT: NCAT, Conjunctiva clear, EOMI, Pupils reactive  Neck: Supple, No JVD, No tenderness  Lungs: Clear to auscultation, Breath sound equal bilaterally, No wheezes, No rales  Cardiovascular: S1S2, Regular rhythm  Abdomen: Soft, Nontender, Nondistended, No guarding/rebound, Positive bowel sounds  Extremities: No clubbing, No cyanosis, No edema, No calf tenderness  Neuro: Strength equal bilaterally, No tremors  Psychiatric: Awake and oriented, No anxiety    LABS:  ------------------------------  22 Mar 2017 06:37    130    |  92     |  20.0   ----------------------------<  117    4.0     |  27.0   |  0.77     Ca    9.3        22 Mar 2017 06:37    CARDIAC MARKERS ( 20 Mar 2017 20:56 )  x     / <0.01 ng/mL / x     / x     / x      CARDIAC MARKERS ( 20 Mar 2017 12:37 )  x     / <0.01 ng/mL / x     / x     / x        MEDICATIONS  (STANDING):  pantoprazole    Tablet 40milliGRAM(s) Oral before breakfast  metoprolol 25milliGRAM(s) Oral two times a day  aspirin  chewable 81milliGRAM(s) Oral daily  simvastatin 20milliGRAM(s) Oral at bedtime  oxybutynin 5milliGRAM(s) Oral two times a day  multivitamin 1Tablet(s) Oral daily  enoxaparin Injectable 40milliGRAM(s) SubCutaneous daily    MEDICATIONS  (PRN):  ondansetron Injectable 4milliGRAM(s) IV Push every 6 hours PRN Nausea and/or Vomiting  acetaminophen   Tablet. 650milliGRAM(s) Oral every 6 hours PRN Mild Pain (1 - 3)  oxyCODONE IR 5milliGRAM(s) Oral three times a day PRN Moderate Pain (4 - 6)      ASSESSMENT / PLAN:  -----------------------------------  Chest pain - Serial troponin levels not elevated. Echocardiogram with preserved left ventricular function. Cardiology recommendations for outpatient stress test noted.    Hyponatremia - Repeat laboratory results with improvement in the sodium level. Continue on regular diet.    Nausea - Antiemetic medications as needed.    GERD - On pantoprazole.    Back pain - Analgesic medications as needed.    Discussed with the patient's  at the bedside.    35 minutes total time

## 2022-09-11 NOTE — DISCHARGE NOTE ADULT - HOSPITAL COURSE
SUNIL Alejandre SUNIL Alejandre 76F presented with chest pain which woke her from sleep and was improved with nitroglycerin. On presentation, BP(161/91), WBC(12.3), Na(122), CPK/Trop(238/0.01). Aspirin and beta blocker therapy were initiated for acute coronary syndrome. The patient also reported episodes of nausea and vomiting due to back pain from a recent fall which was thought to contribute to her hyponatremia. The patient was seen by Cardiology in consultation in regards to the chest discomfort. Echocardiogram noted normal global left ventricular systolic function, ejection fraction of 60 to 65%, no pericardial effusion. Repeat laboratory results noted improvement in the sodium level. Serial troponin levels were not elevated. The patient was seen by Cardiology and the recommendation was for further evaluation with an outpatient stress test. The patient was discharged home with instructions to follow up with Cardiology. 76F presented with chest pain which woke her from sleep and was improved with nitroglycerin. On presentation, BP(161/91), WBC(12.3), Na(122), CPK/Trop(238/0.01). Aspirin and beta blocker therapy were initiated for acute coronary syndrome. The patient also reported episodes of nausea and vomiting due to back pain from a recent fall which was thought to contribute to her hyponatremia. The patient was seen by Cardiology in consultation in regards to the chest discomfort. Echocardiogram noted normal global left ventricular systolic function, ejection fraction of 60 to 65%, no pericardial effusion. Repeat laboratory results noted improvement in the sodium level. Serial troponin levels were not elevated. The patient was seen by Cardiology and the recommendation was for further evaluation with an outpatient stress test. The patient was discharged home with instructions to follow up with Cardiology.    35 minutes total time

## 2023-05-03 NOTE — H&P ADULT - NEGATIVE OPHTHALMOLOGIC SYMPTOMS
Per MD, not to start levophed unless MAP is below 65.   no diplopia/no blurred vision L/no blurred vision R/no photophobia

## 2023-06-15 ENCOUNTER — OFFICE (OUTPATIENT)
Dept: URBAN - METROPOLITAN AREA CLINIC 12 | Facility: CLINIC | Age: 83
Setting detail: OPHTHALMOLOGY
End: 2023-06-15
Payer: MEDICARE

## 2023-06-15 DIAGNOSIS — H35.3131: ICD-10-CM

## 2023-06-15 DIAGNOSIS — H25.13: ICD-10-CM

## 2023-06-15 DIAGNOSIS — H43.813: ICD-10-CM

## 2023-06-15 PROCEDURE — 92250 FUNDUS PHOTOGRAPHY W/I&R: CPT | Performed by: OPHTHALMOLOGY

## 2023-06-15 PROCEDURE — 99204 OFFICE O/P NEW MOD 45 MIN: CPT | Performed by: OPHTHALMOLOGY

## 2023-06-15 ASSESSMENT — REFRACTION_CURRENTRX
OS_SPHERE: +2.50
OS_CYLINDER: -1.25
OD_SPHERE: +2.00
OD_VPRISM_DIRECTION: PROGS
OD_OVR_VA: 20/
OS_AXIS: 077
OD_ADD: +2.50
OS_ADD: +2.50
OD_AXIS: 057
OD_CYLINDER: -1.50
OS_VPRISM_DIRECTION: PROGS
OS_OVR_VA: 20/

## 2023-06-15 ASSESSMENT — REFRACTION_AUTOREFRACTION
OD_AXIS: 036
OS_SPHERE: +3.00
OD_SPHERE: +1.50
OS_CYLINDER: -2.25
OS_AXIS: 095
OD_CYLINDER: -0.75

## 2023-06-15 ASSESSMENT — KERATOMETRY
OD_K1POWER_DIOPTERS: 41.75
OS_AXISANGLE_DEGREES: 164
OS_K2POWER_DIOPTERS: 41.75
OS_K1POWER_DIOPTERS: 41.00
OD_AXISANGLE_DEGREES: 089
METHOD_AUTO_MANUAL: AUTO
OD_K2POWER_DIOPTERS: 42.00

## 2023-06-15 ASSESSMENT — SPHEQUIV_DERIVED
OD_SPHEQUIV: 1.125
OS_SPHEQUIV: 1.875
OD_SPHEQUIV: 1

## 2023-06-15 ASSESSMENT — TONOMETRY
OS_IOP_MMHG: 21
OD_IOP_MMHG: 20

## 2023-06-15 ASSESSMENT — CONFRONTATIONAL VISUAL FIELD TEST (CVF)
OS_FINDINGS: FULL
OD_FINDINGS: FULL

## 2023-06-15 ASSESSMENT — VISUAL ACUITY
OD_BCVA: 20/60
OS_BCVA: 20/60

## 2023-06-15 ASSESSMENT — AXIALLENGTH_DERIVED
OD_AL: 23.7515
OD_AL: 23.801
OS_AL: 23.642

## 2023-06-15 ASSESSMENT — LID POSITION - DERMATOCHALASIS
OD_DERMATOCHALASIS: RUL
OS_DERMATOCHALASIS: LUL

## 2023-06-15 ASSESSMENT — REFRACTION_MANIFEST
OD_CYLINDER: -1.00
OD_VA1: 20/25
OD_AXIS: 45
OD_SPHERE: +1.50

## 2023-06-15 ASSESSMENT — LID EXAM ASSESSMENTS: OS_TRICHIASIS: ABSENT

## 2023-07-06 NOTE — PATIENT PROFILE ADULT. - MEDICATION ADMINISTRATION INFO, PROFILE
----- Message from Josie De La Cruz sent at 7/6/2023 11:13 AM CDT -----  Contact: tomas Summers  .Type:  Same Day Appointment Request    Caller is requesting a same day appointment.  Caller declined first available appointment listed below.    Name of Caller: tomas Summers   When is the first available appointment?   Symptoms: Depo shot   Best Call Back Number: .[phone]   Additional Information:           no concerns

## 2023-07-07 ENCOUNTER — OFFICE (OUTPATIENT)
Dept: URBAN - METROPOLITAN AREA CLINIC 12 | Facility: CLINIC | Age: 83
Setting detail: OPHTHALMOLOGY
End: 2023-07-07
Payer: MEDICARE

## 2023-07-07 DIAGNOSIS — H25.12: ICD-10-CM

## 2023-07-07 DIAGNOSIS — H25.13: ICD-10-CM

## 2023-07-07 PROBLEM — H43.813 POSTERIOR VITREOUS DETACHMENT; BOTH EYES: Status: ACTIVE | Noted: 2023-06-15

## 2023-07-07 PROCEDURE — 92136 OPHTHALMIC BIOMETRY: CPT | Performed by: OPHTHALMOLOGY

## 2023-07-07 PROCEDURE — 99213 OFFICE O/P EST LOW 20 MIN: CPT | Performed by: OPHTHALMOLOGY

## 2023-07-07 ASSESSMENT — REFRACTION_AUTOREFRACTION
OS_CYLINDER: -2.00
OD_CYLINDER: -0.75
OD_SPHERE: +1.50
OS_SPHERE: +2.75
OD_AXIS: 049
OS_AXIS: 097

## 2023-07-07 ASSESSMENT — SPHEQUIV_DERIVED
OD_SPHEQUIV: 1
OD_SPHEQUIV: 1.125
OS_SPHEQUIV: 1.75

## 2023-07-07 ASSESSMENT — VISUAL ACUITY
OS_BCVA: 20/60-2
OD_BCVA: 20/50-2

## 2023-07-07 ASSESSMENT — KERATOMETRY
OD_K2POWER_DIOPTERS: 42.25
OD_K1POWER_DIOPTERS: 42.00
METHOD_AUTO_MANUAL: AUTO
OS_AXISANGLE_DEGREES: 013
OS_K2POWER_DIOPTERS: 42.00
OS_K1POWER_DIOPTERS: 41.25
OD_AXISANGLE_DEGREES: 130

## 2023-07-07 ASSESSMENT — TONOMETRY
OS_IOP_MMHG: 17
OD_IOP_MMHG: 16

## 2023-07-07 ASSESSMENT — LID EXAM ASSESSMENTS: OS_TRICHIASIS: ABSENT

## 2023-07-07 ASSESSMENT — REFRACTION_CURRENTRX
OD_SPHERE: +2.00
OD_AXIS: 057
OD_CYLINDER: -1.50
OS_VPRISM_DIRECTION: PROGS
OS_ADD: +2.50
OD_VPRISM_DIRECTION: PROGS
OD_ADD: +2.50
OS_OVR_VA: 20/
OS_CYLINDER: -1.25
OS_AXIS: 077
OS_SPHERE: +2.50
OD_OVR_VA: 20/

## 2023-07-07 ASSESSMENT — REFRACTION_MANIFEST
OD_AXIS: 45
OD_SPHERE: +1.50
OD_CYLINDER: -1.00
OD_VA1: 20/25

## 2023-07-07 ASSESSMENT — CONFRONTATIONAL VISUAL FIELD TEST (CVF)
OD_FINDINGS: FULL
OS_FINDINGS: FULL

## 2023-07-07 ASSESSMENT — AXIALLENGTH_DERIVED
OD_AL: 23.6588
OS_AL: 23.5988
OD_AL: 23.7079

## 2023-07-07 ASSESSMENT — LID POSITION - DERMATOCHALASIS
OS_DERMATOCHALASIS: LUL
OD_DERMATOCHALASIS: RUL

## 2023-07-17 ENCOUNTER — ASC (OUTPATIENT)
Dept: URBAN - METROPOLITAN AREA SURGERY 8 | Facility: SURGERY | Age: 83
Setting detail: OPHTHALMOLOGY
End: 2023-07-17
Payer: MEDICARE

## 2023-07-17 DIAGNOSIS — H25.12: ICD-10-CM

## 2023-07-17 DIAGNOSIS — H52.212: ICD-10-CM

## 2023-07-17 PROCEDURE — FEMTO FEMTOSECOND LASER: Performed by: OPHTHALMOLOGY

## 2023-07-17 PROCEDURE — 66984 XCAPSL CTRC RMVL W/O ECP: CPT | Performed by: OPHTHALMOLOGY

## 2023-07-18 ENCOUNTER — RX ONLY (RX ONLY)
Age: 83
End: 2023-07-18

## 2023-07-18 ENCOUNTER — OFFICE (OUTPATIENT)
Dept: URBAN - METROPOLITAN AREA CLINIC 12 | Facility: CLINIC | Age: 83
Setting detail: OPHTHALMOLOGY
End: 2023-07-18
Payer: MEDICARE

## 2023-07-18 DIAGNOSIS — Z96.1: ICD-10-CM

## 2023-07-18 PROCEDURE — 99024 POSTOP FOLLOW-UP VISIT: CPT | Performed by: OPTOMETRIST

## 2023-07-18 ASSESSMENT — REFRACTION_CURRENTRX
OS_ADD: +2.50
OS_VPRISM_DIRECTION: PROGS
OD_VPRISM_DIRECTION: PROGS
OD_ADD: +2.50
OS_OVR_VA: 20/
OS_SPHERE: +2.50
OD_OVR_VA: 20/
OS_CYLINDER: -1.25
OD_SPHERE: +2.00
OD_AXIS: 057
OD_CYLINDER: -1.50
OS_AXIS: 077

## 2023-07-18 ASSESSMENT — REFRACTION_AUTOREFRACTION
OD_CYLINDER: -0.50
OS_CYLINDER: -1.50
OD_AXIS: 046
OS_AXIS: 090
OD_SPHERE: +1.25
OS_SPHERE: +1.50

## 2023-07-18 ASSESSMENT — CORNEAL EDEMA CLINICAL DESCRIPTION: OS_CORNEALEDEMA: T

## 2023-07-18 ASSESSMENT — SPHEQUIV_DERIVED
OD_SPHEQUIV: 1
OS_SPHEQUIV: 0.75
OD_SPHEQUIV: 1

## 2023-07-18 ASSESSMENT — CONFRONTATIONAL VISUAL FIELD TEST (CVF)
OD_FINDINGS: FULL
OS_FINDINGS: FULL

## 2023-07-18 ASSESSMENT — KERATOMETRY
METHOD_AUTO_MANUAL: AUTO
OS_AXISANGLE_DEGREES: 009
OD_K2POWER_DIOPTERS: 42.25
OD_AXISANGLE_DEGREES: 090
OD_K1POWER_DIOPTERS: 42.25
OS_K1POWER_DIOPTERS: 40.00
OS_K2POWER_DIOPTERS: 41.75

## 2023-07-18 ASSESSMENT — REFRACTION_MANIFEST
OD_AXIS: 45
OD_CYLINDER: -1.00
OD_SPHERE: +1.50
OD_VA1: 20/25

## 2023-07-18 ASSESSMENT — TONOMETRY: OS_IOP_MMHG: 20

## 2023-07-18 ASSESSMENT — LID EXAM ASSESSMENTS: OS_TRICHIASIS: ABSENT

## 2023-07-18 ASSESSMENT — LID POSITION - DERMATOCHALASIS
OS_DERMATOCHALASIS: LUL
OD_DERMATOCHALASIS: RUL

## 2023-07-18 ASSESSMENT — VISUAL ACUITY
OS_BCVA: 20/40
OD_BCVA: 20/30-

## 2023-07-18 ASSESSMENT — AXIALLENGTH_DERIVED
OD_AL: 23.6616
OD_AL: 23.6616
OS_AL: 24.2836

## 2023-07-25 ENCOUNTER — OFFICE (OUTPATIENT)
Dept: URBAN - METROPOLITAN AREA CLINIC 12 | Facility: CLINIC | Age: 83
Setting detail: OPHTHALMOLOGY
End: 2023-07-25
Payer: MEDICARE

## 2023-07-25 DIAGNOSIS — H25.11: ICD-10-CM

## 2023-07-25 PROCEDURE — 92136 OPHTHALMIC BIOMETRY: CPT | Performed by: OPHTHALMOLOGY

## 2023-07-25 ASSESSMENT — REFRACTION_AUTOREFRACTION
OS_SPHERE: +0.50
OD_CYLINDER: -0.75
OD_SPHERE: +1.50
OS_CYLINDER: -0.75
OD_AXIS: 038
OS_AXIS: 083

## 2023-07-25 ASSESSMENT — KERATOMETRY
METHOD_AUTO_MANUAL: AUTO
OD_K2POWER_DIOPTERS: 42.25
OD_K1POWER_DIOPTERS: 42.00
OS_K2POWER_DIOPTERS: 41.75
OS_AXISANGLE_DEGREES: 008
OS_K1POWER_DIOPTERS: 41.50
OD_AXISANGLE_DEGREES: 172

## 2023-07-25 ASSESSMENT — TONOMETRY
OS_IOP_MMHG: 16
OD_IOP_MMHG: 19

## 2023-07-25 ASSESSMENT — REFRACTION_CURRENTRX
OD_AXIS: 057
OD_VPRISM_DIRECTION: PROGS
OS_CYLINDER: -1.25
OS_ADD: +2.50
OS_SPHERE: +2.50
OS_VPRISM_DIRECTION: PROGS
OD_CYLINDER: -1.50
OD_ADD: +2.50
OS_AXIS: 077
OS_OVR_VA: 20/
OD_SPHERE: +2.00
OD_OVR_VA: 20/

## 2023-07-25 ASSESSMENT — VISUAL ACUITY
OD_BCVA: 20/30
OS_BCVA: 20/50-2

## 2023-07-25 ASSESSMENT — REFRACTION_MANIFEST
OD_VA1: 20/25
OD_AXIS: 45
OD_SPHERE: +1.50
OD_CYLINDER: -1.00

## 2023-07-25 ASSESSMENT — SPHEQUIV_DERIVED
OD_SPHEQUIV: 1.125
OS_SPHEQUIV: 0.125
OD_SPHEQUIV: 1

## 2023-07-25 ASSESSMENT — LID POSITION - DERMATOCHALASIS
OS_DERMATOCHALASIS: LUL
OD_DERMATOCHALASIS: RUL

## 2023-07-25 ASSESSMENT — LID EXAM ASSESSMENTS: OS_TRICHIASIS: ABSENT

## 2023-07-25 ASSESSMENT — AXIALLENGTH_DERIVED
OD_AL: 23.7079
OD_AL: 23.6588
OS_AL: 24.2498

## 2023-07-25 ASSESSMENT — CONFRONTATIONAL VISUAL FIELD TEST (CVF)
OD_FINDINGS: FULL
OS_FINDINGS: FULL

## 2023-07-25 ASSESSMENT — CORNEAL EDEMA CLINICAL DESCRIPTION: OS_CORNEALEDEMA: ABSENT

## 2023-07-31 ENCOUNTER — ASC (OUTPATIENT)
Dept: URBAN - METROPOLITAN AREA SURGERY 8 | Facility: SURGERY | Age: 83
Setting detail: OPHTHALMOLOGY
End: 2023-07-31
Payer: MEDICARE

## 2023-07-31 DIAGNOSIS — H25.11: ICD-10-CM

## 2023-07-31 DIAGNOSIS — H52.211: ICD-10-CM

## 2023-07-31 PROCEDURE — 66984 XCAPSL CTRC RMVL W/O ECP: CPT | Performed by: OPHTHALMOLOGY

## 2023-07-31 PROCEDURE — FEMTO PRECISION LASER CATARACT SURGERY: Performed by: OPHTHALMOLOGY

## 2023-08-01 ENCOUNTER — RX ONLY (RX ONLY)
Age: 83
End: 2023-08-01

## 2023-08-01 ENCOUNTER — OFFICE (OUTPATIENT)
Dept: URBAN - METROPOLITAN AREA CLINIC 12 | Facility: CLINIC | Age: 83
Setting detail: OPHTHALMOLOGY
End: 2023-08-01
Payer: MEDICARE

## 2023-08-01 DIAGNOSIS — Z96.1: ICD-10-CM

## 2023-08-01 PROCEDURE — 99024 POSTOP FOLLOW-UP VISIT: CPT | Performed by: OPTOMETRIST

## 2023-08-01 ASSESSMENT — REFRACTION_CURRENTRX
OS_CYLINDER: -1.25
OD_SPHERE: +2.00
OS_VPRISM_DIRECTION: PROGS
OS_ADD: +2.50
OS_SPHERE: +2.50
OS_AXIS: 077
OD_AXIS: 057
OD_ADD: +2.50
OD_VPRISM_DIRECTION: PROGS
OD_CYLINDER: -1.50
OD_OVR_VA: 20/
OS_OVR_VA: 20/

## 2023-08-01 ASSESSMENT — CONFRONTATIONAL VISUAL FIELD TEST (CVF)
OS_FINDINGS: FULL
OD_FINDINGS: FULL

## 2023-08-01 ASSESSMENT — REFRACTION_AUTOREFRACTION
OD_CYLINDER: -0.25
OD_SPHERE: +0.25
OS_AXIS: 078
OS_SPHERE: +0.25
OS_CYLINDER: -1.00
OD_AXIS: 154

## 2023-08-01 ASSESSMENT — LID EXAM ASSESSMENTS: OS_TRICHIASIS: ABSENT

## 2023-08-01 ASSESSMENT — SPHEQUIV_DERIVED
OD_SPHEQUIV: 0.125
OS_SPHEQUIV: -0.25
OD_SPHEQUIV: 1

## 2023-08-01 ASSESSMENT — TONOMETRY
OD_IOP_MMHG: 16
OS_IOP_MMHG: 16

## 2023-08-01 ASSESSMENT — AXIALLENGTH_DERIVED
OD_AL: 23.801
OD_AL: 24.1531
OS_AL: 24.4051

## 2023-08-01 ASSESSMENT — KERATOMETRY
OS_K1POWER_DIOPTERS: 41.00
OD_K2POWER_DIOPTERS: 42.00
OS_AXISANGLE_DEGREES: 001
METHOD_AUTO_MANUAL: AUTO
OS_K2POWER_DIOPTERS: 42.25
OD_K1POWER_DIOPTERS: 41.75
OD_AXISANGLE_DEGREES: 084

## 2023-08-01 ASSESSMENT — REFRACTION_MANIFEST
OD_AXIS: 45
OD_CYLINDER: -1.00
OD_VA1: 20/25
OD_SPHERE: +1.50

## 2023-08-01 ASSESSMENT — VISUAL ACUITY
OD_BCVA: 20/25-2
OS_BCVA: 20/25-1

## 2023-08-01 ASSESSMENT — LID POSITION - DERMATOCHALASIS
OD_DERMATOCHALASIS: RUL
OS_DERMATOCHALASIS: LUL

## 2023-08-08 ENCOUNTER — OFFICE (OUTPATIENT)
Dept: URBAN - METROPOLITAN AREA CLINIC 12 | Facility: CLINIC | Age: 83
Setting detail: OPHTHALMOLOGY
End: 2023-08-08
Payer: MEDICARE

## 2023-08-08 VITALS — HEIGHT: 55 IN

## 2023-08-08 DIAGNOSIS — Z96.1: ICD-10-CM

## 2023-08-08 PROCEDURE — 99024 POSTOP FOLLOW-UP VISIT: CPT | Performed by: OPTOMETRIST

## 2023-08-08 ASSESSMENT — LID POSITION - DERMATOCHALASIS
OD_DERMATOCHALASIS: RUL
OS_DERMATOCHALASIS: LUL

## 2023-08-08 ASSESSMENT — REFRACTION_AUTOREFRACTION
OD_AXIS: 139
OD_SPHERE: -0.25
OD_CYLINDER: -0.25
OS_AXIS: 81
OS_CYLINDER: -1.25
OS_SPHERE: +0.75

## 2023-08-08 ASSESSMENT — KERATOMETRY
OD_K1POWER_DIOPTERS: 41.75
OS_K2POWER_DIOPTERS: 42.00
OD_K2POWER_DIOPTERS: 42.25
METHOD_AUTO_MANUAL: AUTO
OS_K1POWER_DIOPTERS: 41.75
OS_AXISANGLE_DEGREES: 177
OD_AXISANGLE_DEGREES: 79

## 2023-08-08 ASSESSMENT — AXIALLENGTH_DERIVED
OS_AL: 24.1531
OD_AL: 23.7544
OD_AL: 24.3102

## 2023-08-08 ASSESSMENT — SPHEQUIV_DERIVED
OD_SPHEQUIV: -0.375
OD_SPHEQUIV: 1
OS_SPHEQUIV: 0.125

## 2023-08-08 ASSESSMENT — REFRACTION_MANIFEST
OD_AXIS: 45
OD_CYLINDER: -1.00
OD_SPHERE: +1.50
OD_VA1: 20/25

## 2023-08-08 ASSESSMENT — REFRACTION_CURRENTRX
OD_VPRISM_DIRECTION: PROGS
OS_VPRISM_DIRECTION: PROGS
OS_OVR_VA: 20/
OS_CYLINDER: -1.25
OD_CYLINDER: -1.50
OS_AXIS: 077
OD_SPHERE: +2.00
OD_OVR_VA: 20/
OD_ADD: +2.50
OS_ADD: +2.50
OD_AXIS: 057
OS_SPHERE: +2.50

## 2023-08-08 ASSESSMENT — CONFRONTATIONAL VISUAL FIELD TEST (CVF)
OS_FINDINGS: FULL
OD_FINDINGS: FULL

## 2023-08-08 ASSESSMENT — VISUAL ACUITY
OD_BCVA: 20/30+1
OS_BCVA: 20/30+1

## 2023-08-08 ASSESSMENT — LID EXAM ASSESSMENTS: OS_TRICHIASIS: ABSENT

## 2023-08-08 ASSESSMENT — TONOMETRY
OD_IOP_MMHG: 13
OS_IOP_MMHG: 11

## 2023-08-31 ENCOUNTER — OFFICE (OUTPATIENT)
Dept: URBAN - METROPOLITAN AREA CLINIC 12 | Facility: CLINIC | Age: 83
Setting detail: OPHTHALMOLOGY
End: 2023-08-31
Payer: MEDICARE

## 2023-08-31 DIAGNOSIS — H02.831: ICD-10-CM

## 2023-08-31 DIAGNOSIS — H35.3131: ICD-10-CM

## 2023-08-31 DIAGNOSIS — H02.834: ICD-10-CM

## 2023-08-31 DIAGNOSIS — H02.014: ICD-10-CM

## 2023-08-31 DIAGNOSIS — H52.7: ICD-10-CM

## 2023-08-31 DIAGNOSIS — H43.813: ICD-10-CM

## 2023-08-31 DIAGNOSIS — Z96.1: ICD-10-CM

## 2023-08-31 PROCEDURE — 99024 POSTOP FOLLOW-UP VISIT: CPT | Performed by: OPHTHALMOLOGY

## 2023-08-31 ASSESSMENT — REFRACTION_CURRENTRX
OD_VPRISM_DIRECTION: PROGS
OD_OVR_VA: 20/
OS_ADD: +2.50
OS_CYLINDER: -1.25
OS_VPRISM_DIRECTION: PROGS
OD_ADD: +2.50
OS_OVR_VA: 20/
OD_CYLINDER: -1.50
OS_SPHERE: +2.50
OS_AXIS: 077
OD_SPHERE: +2.00
OD_AXIS: 057

## 2023-08-31 ASSESSMENT — REFRACTION_MANIFEST
OD_CYLINDER: -1.00
OD_SPHERE: +1.50
OD_VA1: 20/25
OD_AXIS: 45

## 2023-08-31 ASSESSMENT — CONFRONTATIONAL VISUAL FIELD TEST (CVF)
OD_FINDINGS: FULL
OS_FINDINGS: FULL

## 2023-08-31 ASSESSMENT — REFRACTION_AUTOREFRACTION
OD_AXIS: 141
OS_AXIS: 092
OD_SPHERE: -0.25
OS_CYLINDER: -1.00
OS_SPHERE: +0.50
OD_CYLINDER: -0.25

## 2023-08-31 ASSESSMENT — VISUAL ACUITY
OS_BCVA: 20/25-3
OD_BCVA: 20/30

## 2023-08-31 ASSESSMENT — SPHEQUIV_DERIVED
OS_SPHEQUIV: 0
OD_SPHEQUIV: 1
OD_SPHEQUIV: -0.375

## 2023-08-31 ASSESSMENT — LID EXAM ASSESSMENTS: OS_TRICHIASIS: ABSENT

## 2023-08-31 ASSESSMENT — LID POSITION - DERMATOCHALASIS
OD_DERMATOCHALASIS: RUL
OS_DERMATOCHALASIS: LUL

## 2023-08-31 ASSESSMENT — TONOMETRY
OS_IOP_MMHG: 15
OD_IOP_MMHG: 16

## 2024-08-23 ENCOUNTER — OFFICE (OUTPATIENT)
Dept: URBAN - METROPOLITAN AREA CLINIC 12 | Facility: CLINIC | Age: 84
Setting detail: OPHTHALMOLOGY
End: 2024-08-23
Payer: MEDICARE

## 2024-08-23 DIAGNOSIS — H35.3131: ICD-10-CM

## 2024-08-23 DIAGNOSIS — H43.813: ICD-10-CM

## 2024-08-23 DIAGNOSIS — H02.014: ICD-10-CM

## 2024-08-23 PROCEDURE — 92014 COMPRE OPH EXAM EST PT 1/>: CPT | Performed by: OPHTHALMOLOGY

## 2024-08-23 PROCEDURE — 92134 CPTRZ OPH DX IMG PST SGM RTA: CPT | Performed by: OPHTHALMOLOGY

## 2024-08-23 ASSESSMENT — LID EXAM ASSESSMENTS: OS_TRICHIASIS: ABSENT

## 2024-08-23 ASSESSMENT — LID POSITION - DERMATOCHALASIS
OS_DERMATOCHALASIS: LUL
OD_DERMATOCHALASIS: RUL

## 2024-08-23 ASSESSMENT — CONFRONTATIONAL VISUAL FIELD TEST (CVF)
OS_FINDINGS: FULL
OD_FINDINGS: FULL